# Patient Record
Sex: MALE | Race: WHITE | NOT HISPANIC OR LATINO | Employment: OTHER | URBAN - METROPOLITAN AREA
[De-identification: names, ages, dates, MRNs, and addresses within clinical notes are randomized per-mention and may not be internally consistent; named-entity substitution may affect disease eponyms.]

---

## 2024-05-28 ENCOUNTER — HOSPITAL ENCOUNTER (EMERGENCY)
Facility: HOSPITAL | Age: 69
Discharge: HOME | End: 2024-05-28
Attending: EMERGENCY MEDICINE | Admitting: EMERGENCY MEDICINE
Payer: MEDICARE

## 2024-05-28 VITALS
BODY MASS INDEX: 21.94 KG/M2 | TEMPERATURE: 99 F | DIASTOLIC BLOOD PRESSURE: 84 MMHG | OXYGEN SATURATION: 99 % | HEIGHT: 74 IN | HEART RATE: 88 BPM | WEIGHT: 171 LBS | SYSTOLIC BLOOD PRESSURE: 142 MMHG | RESPIRATION RATE: 18 BRPM

## 2024-05-28 DIAGNOSIS — S01.512A LACERATION OF BUCCAL MUCOSA, INITIAL ENCOUNTER: ICD-10-CM

## 2024-05-28 DIAGNOSIS — S01.511A LACERATION OF VERMILION BORDER OF UPPER LIP, INITIAL ENCOUNTER: ICD-10-CM

## 2024-05-28 PROCEDURE — 12052 INTMD RPR FACE/MM 2.6-5.0 CM: CPT

## 2024-05-28 PROCEDURE — 271N000002 HC RX 271: Performed by: EMERGENCY MEDICINE

## 2024-05-28 PROCEDURE — 90714 TD VACC NO PRESV 7 YRS+ IM: CPT

## 2024-05-28 PROCEDURE — 90471 IMMUNIZATION ADMIN: CPT

## 2024-05-28 PROCEDURE — 250N000011 HC RX IP 250 OP 636

## 2024-05-28 PROCEDURE — 250N000009 HC RX 250: Performed by: EMERGENCY MEDICINE

## 2024-05-28 PROCEDURE — 99284 EMERGENCY DEPT VISIT MOD MDM: CPT | Mod: 25

## 2024-05-28 RX ORDER — METHYLCELLULOSE 4000CPS 30 %
POWDER (GRAM) MISCELLANEOUS ONCE
Status: COMPLETED | OUTPATIENT
Start: 2024-05-28 | End: 2024-05-28

## 2024-05-28 RX ADMIN — CLOSTRIDIUM TETANI TOXOID ANTIGEN (FORMALDEHYDE INACTIVATED) AND CORYNEBACTERIUM DIPHTHERIAE TOXOID ANTIGEN (FORMALDEHYDE INACTIVATED) 0.5 ML: 5; 2 INJECTION, SUSPENSION INTRAMUSCULAR at 15:29

## 2024-05-28 RX ADMIN — Medication 3 ML: at 15:58

## 2024-05-28 RX ADMIN — METHYLCELLULOSE: 2 POWDER, FOR SOLUTION ORAL at 15:58

## 2024-05-28 ASSESSMENT — COLUMBIA-SUICIDE SEVERITY RATING SCALE - C-SSRS
2. HAVE YOU ACTUALLY HAD ANY THOUGHTS OF KILLING YOURSELF IN THE PAST MONTH?: NO
6. HAVE YOU EVER DONE ANYTHING, STARTED TO DO ANYTHING, OR PREPARED TO DO ANYTHING TO END YOUR LIFE?: NO
1. IN THE PAST MONTH, HAVE YOU WISHED YOU WERE DEAD OR WISHED YOU COULD GO TO SLEEP AND NOT WAKE UP?: NO

## 2024-05-28 ASSESSMENT — ACTIVITIES OF DAILY LIVING (ADL)
ADLS_ACUITY_SCORE: 35
ADLS_ACUITY_SCORE: 35

## 2024-05-28 NOTE — ED TRIAGE NOTES
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 Patient is unable to recall what struck him in the face causing the laceration. Patient keeps stating he should remember what hit him but can't and has a difficult time recalling what happened. Patient is unsure about last tetanus shot.      Triage Assessment (Adult)       Row Name 05/28/24 1440          Triage Assessment    Airway WDL WDL        Respiratory WDL    Respiratory WDL WDL        Skin Circulation/Temperature WDL    Skin Circulation/Temperature WDL WDL        Cardiac WDL    Cardiac WDL WDL        Peripheral/Neurovascular WDL    Peripheral Neurovascular WDL WDL        Cognitive/Neuro/Behavioral WDL    Cognitive/Neuro/Behavioral WDL WDL

## 2024-05-28 NOTE — ED PROVIDER NOTES
"Emergency Department Midlevel Supervisory Note     I had a face to face encounter with this patient seen by the Advanced Practice Provider (CAM). I personally made/approved the management plan and take responsibility for the patient management. I personally saw patient and performed a substantive portion of the visit including all aspects of the medical decision making.     ED Course:  3:34 PM Misty Trujillo PA-C staffed patient with me. I agree with their assessment and plan of management, and I will see the patient.  3:45 PM I met with the patient to introduce myself, gather additional history, perform my initial exam, and discuss the plan.  4:40 PM I supervised the laceration repair.      Brief HPI:     Eitan Shankar is a 69 year old male who presents for evaluation of right lip laceration. Patient has a hard time recalling exactly what happened or what hit him. 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     I, Savana Randall, am serving as a scribe to document services personally performed by Stanley Oliver MD, based on my observations and the provider's statements to me.  I, Stanley Oliver MD, attest that Savana Randall is acting in a scribe capacity, has observed my performance of the services and has documented them in accordance with my direction.      Brief Physical Exam: BP (!) 142/84   Pulse 88   Temp 99  F (37.2  C) (Temporal)   Resp 18   Ht 1.88 m (6' 2\")   Wt 77.6 kg (171 lb)   SpO2 99%   BMI 21.96 kg/m      Vitals: BP (!) 142/84   Pulse 88   Temp 99  F (37.2  C) (Temporal)   Resp 18   Ht 1.88 m (6' 2\")   Wt 77.6 kg (171 lb)   SpO2 99%   BMI 21.96 kg/m    General: Appears in no acute distress, awake, alert, interactive.  Eyes: Conjunctivae non-injected. Sclera anicteric.  HENT: Patient has a through and through lip laceration involving vermilion border on right upper lip  Neck: Supple.  Respiratory/Chest: Respiration unlabored.  Abdomen: non distended  Musculoskeletal: Normal " extremities. No edema or erythema.  Skin: Normal color. No rash or diaphoresis.  Neurologic: Face symmetric, moves all extremities spontaneously. Speech clear.  Psychiatric: Oriented to person, place, and time. Affect appropriate.       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  GCS 15.  Plan for CT head and neck.    Tetanus updated    Complex laceration.  This is to be on COVID-19 PA with through and through laceration repair of vermilion border.  Infraorbital nerve block performed to facilitate not distorting wound edges    Patient's now recalling stepping on a board with smacked him in the face causing him to have a laceration    Patient is GCS 15.  He is refusing CT imaging.    Patient has capacity to make this decision    Discussed likely concussion       1. Laceration of vermilion border of upper lip, initial encounter    2. Laceration of buccal mucosa, initial encounter            Labs and Imaging:       I have reviewed the relevant laboratory studies above.          Procedures:  I was present for the key portions of procedures documented in CAM/midlevel note, see midlevel note for further details.    Stanley Oliver MD  [facility] EMERGENCY DEPARTMENT  [address] [place] [id number] [phone]     Stanley Oliver MD  05/28/24 9626

## 2024-05-28 NOTE — DISCHARGE INSTRUCTIONS
You were seen in the ER for evaluation of laceration. Your laceration was cleaned and repaired with 11 sutures (5 external non-absorbable, 5 internal absorbable, 1 deep absorbable). Your tetanus was updated today.    Rest, ice, elevate your extremity, Tylenol and/or ibuprofen as needed for pain. Keep your bandage in place and clean and dry for the first 24 hours, then only clean water - no dirty water (swimming pools, hot tubes, saunas, lakes, etc.) until your sutures are removed.    Soft diet until your sutures are removed and inner laceration is healed to prevent food getting stuck in laceration.    Rest, protect your brain as it is most vulnerable at this time, refrain from any physical activity/contact sports until your symptoms have completely resolved.     Concussion cares: low stimulation/calm activity only, hydration, good nutrition, sleep hygiene, frequent rest breaks, limit screen time.    Tylenol (Acetaminophen) Discharge Instructions:  You may take 2 tablets of regular strength, over-the-counter, Tylenol (acetaminophen) every 4-6 hours as needed for pain.  Take no more than 4000 mg of Tylenol in a 24-hour period.      Avoid taking more than 1 acetaminophen-containing product at a time and be aware that many over-the-counter medications contain a combination of acetaminophen and other products.  If you are taking Tylenol in addition to a combination product please keep track of your daily acetaminophen dose to make sure you do not exceed the recommended 4000 mg.  Taking too much acetaminophen can cause permanent damage to your liver.    Ibuprofen/Naproxen Discharge Instructions:  You may take ibuprofen for pain control.  The maximum dose of (ibuprofen is 3200 mg ) in a 24-hour period.    Take this medication with food to prevent stomach irritation.  With long-term use this medication can irritate the stomach causing pain and lead to development of a stomach ulcer.  If you notice stomach pain or vomiting  of coffee-ground colored vomit or blood, please be seen by a healthcare provider.  Attempt to use this medication for the shortest time possible.      Follow-up with your primary care provider in 5 days for reevaluation and suture removal.     Return to the emergency department for any new or worsening symptoms including worsening pain, redness/warmth/drainage/swelling, streaking redness up your extremity, fever/chills, new weakness/numbness/tingling, confusion/restlessness/agitation, dizziness, weakness/numbness/decreased coordination, facial droop, speech difficulty, drowsiness or inability to wake up, seizures, or other new symptoms.    Take Care!  - Misty Trujillo PA-C

## 2024-05-28 NOTE — ED PROVIDER NOTES
EMERGENCY DEPARTMENT ENCOUNTER      NAME: Eitna Shankar  AGE: 69 year old male  YOB: 1955  MRN: [id number]  EVALUATION DATE & TIME: No admission date for patient encounter.    PCP: No primary care provider on file.    ED PROVIDER: Misty Trujillo PA-C      Chief Complaint   Patient presents with    Laceration         FINAL IMPRESSION:  1. Laceration of vermilion border of upper lip, initial encounter    2. Laceration of buccal mucosa, initial encounter          ED COURSE & MEDICAL DECISION MAKING:    3:21 PM Met with patient for initial interview. Plan for care discussed.  3:33 PM Staffed patient with Dr. Oliver.  4:25 PM Reevaluated and updated patient. I discussed the plan for discharge with the patient, and patient is agreeable. We discussed supportive cares at home and reasons for return to the ER including new or worsening symptoms. All questions and concerns addressed. Patient to be discharged by RN.    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 No LOC, blood thinners, vomiting, numbness/weakness in arms or legs. However, patient does not recall what hit him. Upon exam, patient is afebrile, hemodynamically stable, and in no acute distress. Full-thickness upper lip laceration as pictured below with involvement of [place] boarder. Mild tenderness to palpation of tooth # 6, no loose teeth. Full jaw movement without pain. No facial tenderness to palpation. No midline cervical tenderness to palpation and full ROM without pain. Based on patient's presenting symptoms, imaging was ordered including CT Head, C-spine, and facial bones to rule out intracranial hemorrhage, facial bone fracture, cervical fracture vs post-traumatic subluxation, which patient ultimately declines. Patient declined analgesia upon arrival.     Based on the presenting symptoms, the wound was irrigated,  explored, and closed with 11 total sutures  (5 external non-absorbable, 5 internal absorbable, 1 deep absorbable) as documented below. Tetanus was updated in ED today. Patient was educated on signs of infection including redness, drainage, warmth, fever/chills with instructions to return immediately if infection suspected or new weakness/numbness/tingling. Patient also educated to keep the wound clean and dry for the next 24 hours. Patient advised to set up an appointment with PCP in 5 days for suture removal also recommended concussion clinic follow-up - referral placed today. The patient was advised to return to the ER if new or worsening symptoms develop. The patient was stable and well appearing throughout entire ER visit and upon discharge. The patient verbalizes understanding and agrees with the plan.      Medical Decision Making  Obtained supplemental history:Supplemental history obtained?: No  Reviewed external records: External records reviewed?: No  Care impacted by chronic illness:N/A  Care significantly affected by social determinants of health:Access to Medical Care  Did you consider but not order tests?: Work up considered but not performed and documented in chart, if applicable  Did you interpret images independently?: Independent interpretation of ECG and images noted in documentation, when applicable.  Consultation discussion with other provider:Did you involve another provider (consultant, MH, pharmacy, etc.)?: No  Discharge. No recommendations on prescription strength medication(s). See documentation for any additional details.    MEDICATIONS GIVEN IN THE EMERGENCY:  Medications   Td (tetanus & diphtheria toxoids) -  adult formulation - for ages 7 years and older (0.5 mLs Intramuscular $Given 5/28/24 0717)   lido-EPINEPHrine-tetracaine (LET) topical gel GEL (3 mLs Topical $Given 5/28/24 7750)   methylcellulose powder ( Topical $Given 5/28/24 0771)       NEW PRESCRIPTIONS STARTED AT TODAY'S ER  "VISIT  New Prescriptions    No medications on file          =================================================================    HPI    Patient information was obtained from: patient    Use of : N/A       Eitan Shankar is a 69 year old male who presents to this ED for evaluation of lip laceration after lip laceration after getting 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 No LOC, blood thinners, vomiting, numbness/weakness in arms or legs. However, patient does not recall what hit him.  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  He denies any loss of consciousness, headache, vision changes, vomiting, numbness/weakness/tingling in the arms or legs, neck or back pain.  He is not on any blood thinners.  He denies any loose teeth or pain in the mouth.  He denies any associated chest pain, shortness of breath, difficulty swallowing, or any other complaints.  Again he denies any amnesia or confusion revolving event, but is just unsure of what struck him cut his lip.  He believes he may have stepped on something that ricocheted up, but again he is unsure the mechanism.  He is unsure when his last tetanus was updated.      REVIEW OF SYSTEMS   Review of Systems see HPI    PAST MEDICAL HISTORY:  No past medical history on file.    PAST SURGICAL HISTORY:  No past surgical history on file.        CURRENT MEDICATIONS:    No current outpatient medications on file.      ALLERGIES:  No Known Allergies    FAMILY HISTORY:  No family history on file.    SOCIAL HISTORY:   Social History     Socioeconomic History    Marital status:        VITALS:  BP (!) 142/84   Pulse 91   Temp 99  F (37.2  C) (Temporal)   Resp 18   Ht 1.88 m (6' 2\")   Wt 77.6 kg (171 lb)   SpO2 99%   BMI 21.96 kg/m      PHYSICAL EXAM    Constitutional:  Alert, in no acute distress. " Cooperative.  EYES: Conjunctivae clear. PERRL. EOM intact. Peripheral fields intact.  HENT:  Normocephalic. Full-thickness 2.5 cm upper lip laceration with focal edema as pictured below with involvement of [place] boarder as well as 3 cm inner buccal mucosal laceration. Mild tenderness to palpation of tooth # 6, no loose teeth. Full jaw movement without pain. No facial tenderness to palpation. No midline cervical tenderness to palpation. Full ROM neck without pain. Oropharynx clear. Moist membranes. Tongue without deviation.   Respiratory:  Respirations even, unlabored, in no acute respiratory distress. Speaks in full sentences easily.  Cardiovascular:  Regular rate, good peripheral perfusion.  GI: Soft, flat, non-distended.  Musculoskeletal:  No edema. No cyanosis. Range of motion major extremities intact.    Integument: Warm, Dry. No rash to visualized skin.   Neurologic:  Alert & oriented x4. No focal deficits noted. GCS 15. Sensation intact. Motor intact. Coordination intact. 5/5 strength.   Psych: Normal mood and affect.        LAB:  All pertinent labs reviewed and interpreted.       RADIOLOGY:  Reviewed all pertinent imaging. Please see official radiology report.  Head CT w/o contrast    (Results Pending)   CT Facial Bones without Contrast    (Results Pending)   CT Cervical Spine w/o Contrast    (Results Pending)     PROCEDURES:   PROCEDURE: Laceration Repair   INDICATIONS: Laceration   PROCEDURE PROVIDER: Misty Trujillo PA-C   SITE: Upper lip   TYPE/SIZE: simple, complex, and no foreign body visualized  2.5 cm external, 3.0 cm internal (total length)   FUNCTIONAL ASSESSMENT: Distal sensation, circulation, and motor intact   MEDICATION: 2 mLs of 1% Lidocaine with epinephrine infraorbital nerve block   PREPARATION: irrigation with Normal saline   DEBRIDEMENT: wound explored, no foreign body found   CLOSURE:  Superficial layer closed with 5 stitches of 5-0 Prolene simple interrupted  Internal buccal mucosal  layer closed with 5 stitches of 5-0 Vycril simple interrupted  Deep layer closed with 1 stitches of 5-0 Vycril simple interrupted    Total number of sutures/staples placed: 11     Misty Trujillo PA-C  [facility] EMERGENCY DEPARTMENT  [address] [place] [id number]  [phone]      Misty Trujillo PA-C  05/28/24 7665